# Patient Record
Sex: MALE | Race: WHITE | NOT HISPANIC OR LATINO | URBAN - METROPOLITAN AREA
[De-identification: names, ages, dates, MRNs, and addresses within clinical notes are randomized per-mention and may not be internally consistent; named-entity substitution may affect disease eponyms.]

---

## 2023-12-15 ENCOUNTER — OFFICE VISIT (OUTPATIENT)
Dept: PODIATRY | Facility: CLINIC | Age: 58
End: 2023-12-15
Payer: OTHER GOVERNMENT

## 2023-12-15 VITALS — BODY MASS INDEX: 33.05 KG/M2 | HEART RATE: 62 BPM | WEIGHT: 223.13 LBS | HEIGHT: 69 IN | OXYGEN SATURATION: 97 %

## 2023-12-15 DIAGNOSIS — M79.672 PAIN OF BOTH HEELS: ICD-10-CM

## 2023-12-15 DIAGNOSIS — M79.671 PAIN IN BOTH FEET: ICD-10-CM

## 2023-12-15 DIAGNOSIS — M79.671 PAIN OF BOTH HEELS: ICD-10-CM

## 2023-12-15 DIAGNOSIS — M72.2 PLANTAR FASCIITIS: Primary | ICD-10-CM

## 2023-12-15 DIAGNOSIS — M79.672 PAIN IN BOTH FEET: ICD-10-CM

## 2023-12-15 PROCEDURE — 99203 OFFICE O/P NEW LOW 30 MIN: CPT | Mod: PBBFAC,PN | Performed by: PODIATRIST

## 2023-12-15 PROCEDURE — 99203 PR OFFICE/OUTPT VISIT, NEW, LEVL III, 30-44 MIN: ICD-10-PCS | Mod: S$PBB,,, | Performed by: PODIATRIST

## 2023-12-15 PROCEDURE — 99203 OFFICE O/P NEW LOW 30 MIN: CPT | Mod: S$PBB,,, | Performed by: PODIATRIST

## 2023-12-15 PROCEDURE — 99999 PR PBB SHADOW E&M-NEW PATIENT-LVL III: ICD-10-PCS | Mod: PBBFAC,,, | Performed by: PODIATRIST

## 2023-12-15 PROCEDURE — 99999 PR PBB SHADOW E&M-NEW PATIENT-LVL III: CPT | Mod: PBBFAC,,, | Performed by: PODIATRIST

## 2023-12-15 RX ORDER — AZITHROMYCIN 250 MG/1
250 TABLET, FILM COATED ORAL
COMMUNITY
Start: 2023-12-03

## 2023-12-15 RX ORDER — VARDENAFIL HYDROCHLORIDE 20 MG/1
20 TABLET ORAL
COMMUNITY
Start: 2023-04-11

## 2023-12-15 RX ORDER — TRETINOIN 0.5 MG/G
CREAM TOPICAL
COMMUNITY
Start: 2023-06-05

## 2023-12-15 RX ORDER — DULOXETIN HYDROCHLORIDE 30 MG/1
30 CAPSULE, DELAYED RELEASE ORAL
COMMUNITY
Start: 2023-12-08

## 2023-12-15 RX ORDER — METHYLPREDNISOLONE 4 MG/1
TABLET ORAL
COMMUNITY
Start: 2023-12-03

## 2023-12-15 NOTE — PROGRESS NOTES
"  [address] [facility] [place] [address]  Phone: [phone]   [phone]    Patient's PCP:Aleksandra, Primary Doctor  Referring Provider: [facility]    Subjective:      Chief Complaint:: Heel Pain (bilateral)    ABISAI Rossi is a 58 y.o. male who presents today with a complaint of bilateral heel pain. The current episode started in 1994.  The symptoms include shooting pain . Probable cause of complaint plantar fasciitis.  The symptoms are aggravated by daily work . The problem has stayed the same. Treatment to date have included custom made insoles, PF therapy, heel cups which provided some relief for 3 to 4 years and then he'll start to have pain again.        Vitals:    12/15/23 0844   Pulse: 62   SpO2: 97%   Weight: 101.2 kg (223 lb 1.7 oz)   Height: 5' 9" (1.753 m)   PainSc:   3      Shoe Size: 10.5    Past Surgical History:   Procedure Laterality Date    clavical       HIP SURGERY  1996    TONSILLECTOMY  2010     History reviewed. No pertinent past medical history.  History reviewed. No pertinent family history.     Social History:   Marital Status: Single  Alcohol History:  reports no history of alcohol use.  Tobacco History:  reports that he has never smoked. He has never used smokeless tobacco.  Drug History:  reports no history of drug use.    Review of patient's allergies indicates:   Allergen Reactions    Adult analgesic     Morphine        Current Outpatient Medications   Medication Sig Dispense Refill    azithromycin (Z-ALEJANDRA) 250 MG tablet Take 250 mg by mouth.      DULoxetine (CYMBALTA) 30 MG capsule 30 mg.      methylPREDNISolone (MEDROL DOSEPACK) 4 mg tablet FOLLOW PACKAGE DIRECTIONS      tretinoin, emollient, 0.05 % Crea APPLY SMALL AMOUNT TOPICALLY NIGHTLY      vardenafiL (LEVITRA) 20 MG tablet 20 mg.       No current facility-administered medications for this visit.       Review of Systems   Constitutional:  Negative for chills, fatigue, fever and unexpected weight " change.   HENT:  Negative for hearing loss and trouble swallowing.    Eyes:  Negative for photophobia and visual disturbance.   Respiratory:  Negative for cough, shortness of breath and wheezing.    Cardiovascular:  Negative for chest pain, palpitations and leg swelling.   Gastrointestinal:  Negative for abdominal pain and nausea.   Genitourinary:  Negative for dysuria and frequency.   Musculoskeletal:  Negative for arthralgias, back pain, gait problem, joint swelling, myalgias and neck pain.   Skin:  Negative for rash and wound.   Neurological:  Negative for tremors, seizures, speech difficulty, weakness, numbness and headaches.   Hematological:  Does not bruise/bleed easily.         Objective:        Physical Exam:   Foot Exam    General  General Appearance: appears stated age and healthy   Orientation: alert and oriented to person, place, and time   Affect: appropriate   Gait: unimpaired       Right Foot/Ankle     Inspection and Palpation  Ecchymosis: none  Tenderness: plantar fascia   Swelling: none   Arch: normal  Skin Exam: skin intact; no drainage, no ulcer and no erythema   Neurovascular  Dorsalis pedis: 2+  Posterior tibial: 2+  Capillary Refill: 2+  Varicose veins: not present  Saphenous nerve sensation: normal  Tibial nerve sensation: normal  Superficial peroneal nerve sensation: normal  Deep peroneal nerve sensation: normal  Sural nerve sensation: normal    Edema  Type of edema: non-pitting    Muscle Strength  Ankle dorsiflexion: 5  Ankle plantar flexion: 5  Ankle inversion: 5  Ankle eversion: 5  Great toe extension: 5  Great toe flexion: 5    Range of Motion    Normal right ankle ROM    Tests  Anterior drawer: negative   Talar tilt: negative   PT Tinel's sign: negative    Paresthesia: negative  Comments  Pain on palpation of the infeior medial heel and central plantar heel. No pain present  with side to side compression of the calcaneus. Negative tinnel's sign  at the tarsal tunnel. Negative Smith's  nerve pain. Negative Calcaneal nerve pain. No soft tissue masses. Pain absent  with dorsiflexion of the ankle. No edema, erythema, or ecchymosis noted.       Left Foot/Ankle      Inspection and Palpation  Ecchymosis: none  Tenderness: plantar fascia   Swelling: none   Arch: normal  Skin Exam: skin intact; no drainage, no ulcer and no erythema   Neurovascular  Dorsalis pedis: 2+  Posterior tibial: 2+  Capillary refill: 2+  Varicose veins: not present  Saphenous nerve sensation: normal  Tibial nerve sensation: normal  Superficial peroneal nerve sensation: normal  Deep peroneal nerve sensation: normal  Sural nerve sensation: normal    Edema  Type of edema: non-pitting    Muscle Strength  Ankle dorsiflexion: 5  Ankle plantar flexion: 5  Ankle inversion: 5  Ankle eversion: 5  Great toe extension: 5  Great toe flexion: 5    Range of Motion    Normal left ankle ROM    Tests  Anterior drawer: negative   Talar tilt: negative   PT Tinel's sign: negative  Paresthesia: negative  Comments  Pain on palpation of the infeior medial heel and central plantar heel. No pain present  with side to side compression of the calcaneus. Negative tinnel's sign  at the tarsal tunnel. Negative Smith's nerve pain. Negative Calcaneal nerve pain. No soft tissue masses. Pain absent  with dorsiflexion of the ankle. No edema, erythema, or ecchymosis noted.       Physical Exam  Cardiovascular:      Pulses:           Dorsalis pedis pulses are 2+ on the right side and 2+ on the left side.        Posterior tibial pulses are 2+ on the right side and 2+ on the left side.   Feet:      Right foot:      Skin integrity: No ulcer or erythema.      Left foot:      Skin integrity: No ulcer or erythema.               Right Ankle/Foot Exam     Range of Motion   The patient has normal right ankle ROM.    Comments:  Pain on palpation of the infeior medial heel and central plantar heel. No pain present  with side to side compression of the calcaneus. Negative tinnel's  sign  at the tarsal tunnel. Negative Smith's nerve pain. Negative Calcaneal nerve pain. No soft tissue masses. Pain absent  with dorsiflexion of the ankle. No edema, erythema, or ecchymosis noted.       Left Ankle/Foot Exam     Range of Motion   The patient has normal left ankle ROM.     Comments:  Pain on palpation of the infeior medial heel and central plantar heel. No pain present  with side to side compression of the calcaneus. Negative tinnel's sign  at the tarsal tunnel. Negative Smith's nerve pain. Negative Calcaneal nerve pain. No soft tissue masses. Pain absent  with dorsiflexion of the ankle. No edema, erythema, or ecchymosis noted.         Muscle Strength   Right Lower Extremity   Ankle Dorsiflexion:  5   Plantar flexion:  5/5  Left Lower Extremity   Ankle Dorsiflexion:  5   Plantar flexion:  5/5     Vascular Exam     Right Pulses  Dorsalis Pedis:      2+  Posterior Tibial:      2+        Left Pulses  Dorsalis Pedis:      2+  Posterior Tibial:      2+           Imaging: none            Assessment:       1. Plantar fasciitis    2. Pain of both heels    3. Pain in both feet      Plan:   Plantar fasciitis  -     ORTHOTIC DEVICE (DME)    Pain of both heels  -     ORTHOTIC DEVICE (DME)    Pain in both feet  -     ORTHOTIC DEVICE (DME)      Follow up if symptoms worsen or fail to improve.    Procedures        Discussed different treatment options for heel pain. I gave written and verbal instructions on heel cord stretching and this was demonstrated for the patient. Patient expressed understanding. Discussed wearing appropriate shoe gear and avoiding flats, slippers, sandals, barefoot, and sockfeet. Recommended arch supports. My recommendation for OTC supports is Spenco polysorb replacement insoles or patient may elect more aggressive treatment with prescription arch supports. We also discussed cortisone injections and NSAID therapy.     I am giving the patient a prescription for custom orth[facility] which [facility]. patient also has work boots provided through his job.  He states that these are typically covered 1 pair per year.  I did discussed them that these likely need to be updated more often.  I am going to give him a note to have 2 pair per year provided to prevent excessive wear.    Counseling:     I provided patient education verbally regarding:   Patient diagnosis, treatment options, as well as alternatives, risks, and benefits.     This note was created using Dragon voice recognition software that occasionally misinterpreted phrases or words.

## 2023-12-15 NOTE — PATIENT INSTRUCTIONS

## 2023-12-15 NOTE — LETTER
December 15, 2023      [facility] - Podiatry  [address]  [place] [address]  Phone: [phone]  Fax: [phone]       Patient: Oskar Rossi   YOB: 1965  Date of Visit: 12/15/2023    To Whom It May Concern:    Mallory Rossi  was at [facility] on 12/15/2023. The patient may return to work on 12/17/2023 with no restrictions. Please provide 2 pairs of work boots each year for the patient instead if 1. If you have any questions or concerns, or if I can be of further assistance, please do not hesitate to contact me.    Sincerely, Electronically Signed by: FATEMEH Hernandez MA

## 2024-10-21 ENCOUNTER — OFFICE VISIT (OUTPATIENT)
Dept: FAMILY MEDICINE CLINIC | Facility: CLINIC | Age: 59
End: 2024-10-21
Payer: COMMERCIAL

## 2024-10-21 VITALS
HEART RATE: 60 BPM | OXYGEN SATURATION: 96 % | WEIGHT: 216 LBS | TEMPERATURE: 97.3 F | DIASTOLIC BLOOD PRESSURE: 80 MMHG | BODY MASS INDEX: 30.92 KG/M2 | HEIGHT: 70 IN | SYSTOLIC BLOOD PRESSURE: 158 MMHG

## 2024-10-21 DIAGNOSIS — M54.50 CHRONIC BILATERAL LOW BACK PAIN WITHOUT SCIATICA: ICD-10-CM

## 2024-10-21 DIAGNOSIS — Z12.11 SCREENING FOR MALIGNANT NEOPLASM OF COLON: ICD-10-CM

## 2024-10-21 DIAGNOSIS — M77.11 LATERAL EPICONDYLITIS, RIGHT ELBOW: Primary | ICD-10-CM

## 2024-10-21 DIAGNOSIS — G89.29 CHRONIC BILATERAL LOW BACK PAIN WITHOUT SCIATICA: ICD-10-CM

## 2024-10-21 DIAGNOSIS — G47.33 OSA ON CPAP: ICD-10-CM

## 2024-10-21 DIAGNOSIS — L98.9 SKIN LESION OF FACE: ICD-10-CM

## 2024-10-21 PROCEDURE — 99203 OFFICE O/P NEW LOW 30 MIN: CPT | Performed by: STUDENT IN AN ORGANIZED HEALTH CARE EDUCATION/TRAINING PROGRAM

## 2024-10-21 RX ORDER — MULTIVIT WITH MINERALS/LUTEIN
1000 TABLET ORAL DAILY
COMMUNITY

## 2024-10-21 NOTE — PROGRESS NOTES
"Chief Complaint  Establish [facility], Arm Pain (Right), Elbow Pain, and Referral  (Sleep medicine and physical therapy.)    Subjective        Cisco Bhagat presents to [facility]  History of Present Illness  59-year-old male with YOANNA on CPAP who presents to establish care today.    YOANNA diagnosed initially in 2008.  Followed by pulmonologist in [place].  He uses a CPAP machine.    History of left rotator cuff dysfunction for which he underwent PT in [place].    Last labs in 2023 -mildly leukopenia at 3.5.  All other labs normal.  Total testosterone 612.      Objective   Vital Signs:  /80   Pulse 60   Temp 97.3 °F (36.3 °C)   Ht 177.8 cm (70\")   Wt 98 kg (216 lb)   SpO2 96%   BMI 30.99 kg/m²   Estimated body mass index is 30.99 kg/m² as calculated from the following:    Height as of this encounter: 177.8 cm (70\").    Weight as of this encounter: 98 kg (216 lb).    BMI is >= 30 and <35. (Class 1 Obesity). The following options were offered after discussion;: exercise counseling/recommendations and nutrition counseling/recommendations      Physical Exam  Constitutional:       General: He is not in acute distress.  Eyes:      Conjunctiva/sclera: Conjunctivae normal.   Cardiovascular:      Rate and Rhythm: Normal rate and regular rhythm.   Pulmonary:      Effort: Pulmonary effort is normal. No respiratory distress.   Abdominal:      Palpations: Abdomen is soft.      Tenderness: There is no abdominal tenderness.   Musculoskeletal:         General: No swelling or tenderness. Normal range of motion.      Comments: Tenderness to palpation over right lateral epicondyle of elbow.   Skin:     Findings: Lesion present.   Neurological:      Mental Status: He is alert and oriented to person, place, and time.   Psychiatric:         Mood and Affect: Mood normal.         Behavior: Behavior normal.        Result Review :  The following data was reviewed by: Lu Carey MD on " 10/21/2024:    Data reviewed : see HPI above           Assessment and Plan   Diagnoses and all orders for this visit:    1. Lateral epicondylitis, right elbow (Primary)  -     Ambulatory Referral to Physical Therapy for Evaluation & Treatment    2. BMI 30.0-30.9,adult    3. Chronic bilateral low back pain without sciatica  -     Ambulatory Referral to Physical Therapy for Evaluation & Treatment    4. Skin lesion of face  -     Ambulatory Referral to Dermatology    5. YOANNA on CPAP  -     Ambulatory Referral to Sleep Medicine    6. Screening for malignant neoplasm of colon  -     Ambulatory Referral For Screening Colonoscopy      Lateral epicondylitis of right elbow - no trigger, inciting event. Recommend NSAIDs, counter force bracing, avoidance of triggers. Will refer to PT as well.  Chronic back pain - no red flag symptoms. No sciatica. Recommend PT as above. Okay for NSAIDs/tylenol PRN.  YOANNA on CPAP - compliant. Previously followed with pulmonology. Requests referral to sleep med.  Skin lesion on face - New. No hx of skin cancers. Recommend Derm referral.   Elevated BP - asymptomatic. No hx of HTN. No medications. Recommend home BP monitoring x2weeks. If consistently >140/90, recommend initiation of antihypertensive.       Follow Up   No follow-ups on file.  Patient was given instructions and counseling regarding his condition or for health maintenance advice. Please see specific information pulled into the AVS if appropriate.

## 2024-10-29 ENCOUNTER — PATIENT ROUNDING (BHMG ONLY) (OUTPATIENT)
Dept: FAMILY MEDICINE CLINIC | Facility: CLINIC | Age: 59
End: 2024-10-29
Payer: COMMERCIAL

## 2024-10-29 NOTE — PROGRESS NOTES
October 29, 2024    Hello, may I speak with Cisco Bhagat?    My name is Zeinab Samuel      [facility]  [address] [place] [id number] [phone].    Before we get started may I verify your date of birth? 1965    I am calling to officially welcome you to our practice and ask about your recent visit. Is this a good time to talk? No, my chart message sent

## 2024-11-21 DIAGNOSIS — Z12.11 SCREENING FOR MALIGNANT NEOPLASM OF COLON: Primary | ICD-10-CM

## 2024-12-03 ENCOUNTER — OFFICE VISIT (OUTPATIENT)
Dept: SLEEP MEDICINE | Facility: HOSPITAL | Age: 59
End: 2024-12-03
Payer: COMMERCIAL

## 2024-12-03 VITALS — WEIGHT: 215 LBS | OXYGEN SATURATION: 97 % | BODY MASS INDEX: 30.78 KG/M2 | HEART RATE: 68 BPM | HEIGHT: 70 IN

## 2024-12-03 DIAGNOSIS — G47.33 OSA ON CPAP: Primary | ICD-10-CM

## 2024-12-03 PROCEDURE — 99204 OFFICE O/P NEW MOD 45 MIN: CPT | Performed by: PSYCHIATRY & NEUROLOGY

## 2024-12-03 PROCEDURE — G0463 HOSPITAL OUTPT CLINIC VISIT: HCPCS

## 2024-12-03 NOTE — PROGRESS NOTES
"      Patient Care Team:  Lu Carey MD as PCP - General (Family Medicine)  Carina Gonsalez MD, MPH as Consulting Physician (Sleep Medicine)        History of Present Illness  The patient presents for evaluation of sleep apnea.    He is due for a new CPAP machine this year, with his last study conducted in [place], [place] in either December 2010 or January 2011. He has been using the ResMed machine since 2008, which he credits for his success in various aspects of life. His sleep apnea was severe, to the point where his partner feared he might not wake up. He uses a nasal mask and also utilizes a sleep cycle tomas, which he finds provides accurate data that aligns with his CPAP machine. This tomas distinguishes between talking, snoring, and coughing, and records the amount of sleep interruption he experiences.    [place] bedtime is between 8:45 PM and 9 PM and he gets up at 4:45 AM to 5 AM.  He gets about 6 and half hours sleep and he wakes up and \"ready to go\".  He is always on call for his job.  He denies any parasomnias such as sleepwalking or dream enactment.  Parents told him that he was a terrible sleeper.  He is a [employer]  and is on-call all the time as described above.  He stopped using tobacco at age 23 estimates he has 4-5 alcoholic drinks per week and has 3 to 5 cups of coffee or 2 energy drinks per day.    He had an adult tonsillectomy about 15 years ago.    SOCIAL HISTORY  He is a maritime compliance go, ship , offshore drill rigs.       [place]: 0    Data Reviewed: Reviewed compliance download and medical chart.  We are still waiting, at the time of this initial dictation, his sleep study      PMH:  Past Medical History:   Diagnosis Date    Arthritis 1996    Degenerative osteo R & L shoulder / L Hip    Cancer 2012    Basil cell    Erectile dysfunction 2006    none    GERD (gastroesophageal reflux disease) 2013    Resolved since 2015    Low back pain 1998    Perm " "Lumboscral strain          Allergies:  Patient has no known allergies.     Medication Review:   Current Outpatient Medications on File Prior to Visit   Medication Sig Dispense Refill    CREATINE PO Take  by mouth.      SPIRULINA PO Take  by mouth.      VITAMIN E 1000 UNIT capsule Take 1 capsule by mouth Daily.       No current facility-administered medications on file prior to visit.         Vital Signs:    Vitals:    12/03/24 0745   Pulse: 68   SpO2: 97%   Weight: 97.5 kg (215 lb)   Height: 177.8 cm (70\")        Body mass index is 30.85 kg/m².  Neck Circumference: 16 inches  .BMIFOLLOWUP         Physical Exam:    Constitutional:  Well developed 59 y.o. male that appears in no apparent distress.  Awake & oriented times 3.  Normal mood with normal recent and remote memory and normal judgement.  Eyes:  Conjunctivae normal.  Oropharynx: Moist mucous membranes without exudate and Mallampati 3  Neck: Trachea midline  Respiratory: Effort is not labored  Cardiovascular: Radial pulse regular  Musculoskeletal: Gait appears normal, no digital clubbing evident, no pre-tibial edema        Impression:   Encounter Diagnoses   Name Primary?    YOANNA on CPAP Yes    BMI 30.0-30.9,adult      Patient's BMI is Body mass index is 30.85 kg/m².        Assessment & Plan  1. Sleep Apnea.  His download indicates daily usage of the CPAP machine for an average of 8 hours and 11 minutes. The residual apnea index is 0.7, which is within the normal range of less than 5. Leakages are minimal, averaging at 0.1. The pressure setting is relatively low at 8, within the machine's capacity of 4 to 20. A new CPAP machine will be ordered from the same place he previously obtained it. He uses a nasal cup mask. If the previous study is not available, a new home sleep apnea test may be required. A compliance follow-up is scheduled in 3 months to ensure usage exceeds 4 hours per night on more than 70% of nights.    Follow-up  Return in 3 months for compliance " follow-up.       The patient should practice good sleep hygiene measures.      Weight loss might be beneficial in this patient who has a Body mass index is 30.85 kg/m².      Pathophysiology of YOANNA described to the patient.  Cardiovascular complications of untreated YOANNA also reviewed.      The patient was cautioned about the dangers of drowsy driving.    Patient or patient representative verbalized consent for the use of Ambient Listening during the visit with  Gustavo Gonsalez MD for chart documentation. 12/3/2024  08:17 EST     Gustavo Gonsalez MD  Sleep Medicine  12/03/24  08:12 EST

## 2025-03-06 ENCOUNTER — OFFICE VISIT (OUTPATIENT)
Facility: HOSPITAL | Age: 60
End: 2025-03-06
Payer: COMMERCIAL

## 2025-03-06 VITALS — HEART RATE: 60 BPM | HEIGHT: 70 IN | OXYGEN SATURATION: 95 % | BODY MASS INDEX: 31.09 KG/M2 | WEIGHT: 217.2 LBS

## 2025-03-06 DIAGNOSIS — G47.33 OSA ON CPAP: ICD-10-CM

## 2025-03-06 PROCEDURE — G0463 HOSPITAL OUTPT CLINIC VISIT: HCPCS

## 2025-03-06 NOTE — PROGRESS NOTES
Follow Up Sleep Disorders Center Note       Primary Care Physician: Lu Carey MD    Interval History:   The patient is a 59 y.o. male      History of Present Illness  The patient presents for sleep apnea.    He has been utilizing a CPAP machine, which he reports as being effective in managing his condition. He uses a nasal cup mask and has expressed interest in exploring other potential treatment options. His most recent sleep study was conducted in 2012, but he does not have a copy of the results. 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 He has expressed a desire for a portable CPAP machine that can be easily transported in a container. He reports feeling well-rested and is satisfied with the quality of his sleep.    SOCIAL HISTORY  He retired from the [employer] 15 years ago. He works as a registered .         Downloaded PAP Data Evaluated For Therapeutic Response and Compliance:  DME is [facility] from [facility].  Downloads between 2/4/2025 and 3/5/2025.  Average usage is 8 hours and 23 minutes and 100% of the last 30 days for more than 4 hours.  Average AHI is 0.8.  PAP pressure is 8 cm fixed CWP.    The patient uses a nasal cup mask an N20 interface.    Review of Systems:    A complete review of systems was done and all were negative with the exception of none    Social History:    Social History     Socioeconomic History    Marital status: Single   Tobacco Use    Smoking status: Never    Smokeless tobacco: Never    Tobacco comments:     Chewed at ages 16 - 23   Vaping Use    Vaping status: Never Used   Substance and Sexual Activity    Alcohol use: Yes     Alcohol/week: 7.0 standard drinks of alcohol     Types: 7 Drinks containing 0.5 oz of alcohol per week    Drug use: Never    Sexual activity: Yes     Partners: Female     Birth control/protection: Condom  "      Allergies:  Patient has no known allergies.     Medication Review:  Reviewed.      Vital Signs:    Vitals:    03/06/25 1441   Pulse: 60   SpO2: 95%   Weight: 98.5 kg (217 lb 3.2 oz)   Height: 177.8 cm (70\")     Body mass index is 31.16 kg/m².  .BMIFOLLOWUP    Physical Exam:    Constitutional:  Well developed 59 y.o. male that appears in no apparent distress.  Awake & oriented times 3.  Normal mood with normal recent and remote memory and normal judgement.  Eyes:  Conjunctivae normal.      Self-administered [place] Sleepiness Scale test results: 1  0-5 Lower normal daytime sleepiness  6-10 Higher normal daytime sleepiness  11-12 Mild, 13-15 Moderate, & 16-24 Severe excessive daytime sleepiness       I have reviewed the above results and compared them with the patient's last downloads and reviewed with the patient.    Impression:     Encounter Diagnoses   Name Primary?    BMI 30.0-30.9,adult Yes    YOANNA on CPAP          Assessment & Plan  1. Sleep Apnea.  He has demonstrated commendable adherence to the prescribed treatment regimen, achieving a 100 percent compliance rate over the past 30 days with a minimum usage duration of 4 hours. His current CPAP pressure setting is at 8, and he uses a nasal cup mask. He reports good sleep quality and no issues with the current setup. He was advised to explore the availability of battery packs for his CPAP machine on websites such as CPAP.com, as these do not require a prescription. It was also communicated that insurance coverage for these battery packs is unlikely. A prescription for a battery pack will be provided if necessary. He was informed that his supplies have been ordered for a year.    Follow-up  The patient will follow up in 1 year.         Good sleep hygiene measures should be maintained.  Weight loss would be beneficial in this patient who has obesity class I by Body mass index is 31.16 kg/m².      After evaluating the patient and assessing results available, " the patient is benefiting from the treatment being provided.     The patient will continue CPAP.  Potential side effects of PAP therapy reviewed and addressed as needed.  After clinical evaluation and review of downloads, I recommend no changes to the patient's pressures.      I answered all of the patient's questions.  The patient will call the Sleep Disorder Center for any problems and will follow up 1 year.    Patient or patient representative verbalized consent for the use of Ambient Listening during the visit with  Gustavo Gonsalez MD for chart documentation. 3/6/2025  15:17 EST           Gustavo Gonsalez MD  Sleep Medicine  03/06/25  15:16 EST

## 2025-07-16 DIAGNOSIS — G47.33 OSA ON CPAP: ICD-10-CM

## 2025-07-16 DIAGNOSIS — G89.29 CHRONIC BILATERAL LOW BACK PAIN WITHOUT SCIATICA: ICD-10-CM

## 2025-07-16 DIAGNOSIS — M54.50 CHRONIC BILATERAL LOW BACK PAIN WITHOUT SCIATICA: ICD-10-CM
